# Patient Record
Sex: FEMALE | Race: WHITE | URBAN - METROPOLITAN AREA
[De-identification: names, ages, dates, MRNs, and addresses within clinical notes are randomized per-mention and may not be internally consistent; named-entity substitution may affect disease eponyms.]

---

## 2017-02-16 NOTE — PATIENT DISCUSSION
REVIEWED FINDINGS AND POTENTIAL FOR MISDIAGNOSIS AS DISC EDEMA - AND IF THAT HAPPENS TO MAKE SURE THAT AN OPHTHALMOLOGIST CONFIRMS

## 2017-05-16 NOTE — PATIENT DISCUSSION
REVIEWED FINDINGS AND POTENTIAL FOR MISDIAGNOSIS AS DISC EDEMA - AND IF THAT HAPPENS TO MAKE SURE THAT AN OPHTHALMOLOGIST CONFIRMS.

## 2020-09-10 NOTE — PATIENT DISCUSSION
PT JUST HAD MRI IN THE LAST MONTH - RESULTS - SMALL VESSEL DISEASE AND CAN'T R/O DEMYLINATING DIEASE - NO HX OF MS (GRAND DAUGHTER - NURSE PRACTITIONER - HAD RESULTS ON HER PHONE).

## 2020-09-10 NOTE — PATIENT DISCUSSION
REVIEWED FINDINGS AND POTENTIAL FOR MISDIAGNOSIS AS DISC EDEMA - AND IF THAT HAPPENS TO MAKE SURE THAT AN OPHTHALMOLOGIST CONFIRMS. No No

## 2020-09-10 NOTE — PATIENT DISCUSSION
Patient DENIES any Headache, jaw pain, neck pain, shoulder pain, loss of weight, fever, or loss of appetite to suggest TEMPORAL ARTERITIS.

## 2020-11-05 NOTE — PATIENT DISCUSSION
11 5 20 DIF STUDY - SIG FIXATION LOSS - NO SIG CHANGE OD - SUP VF LOSS OS NONSPECIFIC - ? 2ND OPTIC DISC DRUSEN BUT TYPICALL VF LOSS IS LESS.  RECOM JOSEPHINE WITH NEUROLOGIST TO EVALUATE FOR MS AND SHAMA VALDEZ FOR HER THOUGHTS ON VA LOSS - DISCUSSED OPTION OF EVAL AT .

## 2020-11-16 NOTE — PATIENT DISCUSSION
11/16/20: LIKELY SECONDARY TO ODD. MONITOR. LOW THRESHOLD OF BRAIN IMAGING IF SX WORSEN/CHANGE. AT THIS POINT TVL REPRESENT ODD ETIOLOGY.

## 2022-02-28 ENCOUNTER — IMPORTED ENCOUNTER (OUTPATIENT)
Dept: URBAN - METROPOLITAN AREA CLINIC 31 | Facility: CLINIC | Age: 77
End: 2022-02-28

## 2022-02-28 PROBLEM — H43.813: Noted: 2022-02-28

## 2022-02-28 PROBLEM — H25.811: Noted: 2022-02-28

## 2022-02-28 PROBLEM — H25.813: Noted: 2022-02-28

## 2022-02-28 PROCEDURE — 92004 COMPRE OPH EXAM NEW PT 1/>: CPT

## 2022-02-28 PROCEDURE — 92015 DETERMINE REFRACTIVE STATE: CPT

## 2022-02-28 NOTE — PATIENT DISCUSSION
1.  Discussed the risks benefits alternatives and limitations of cataract surgery including infection bleeding loss of vision retinal tears detachment. The patient stated a full understanding and a desire to proceed with the procedure in both eyes. Refractive options were reviewed. Patient has elected to be optimized for distance vision in both eyes. The patient will still need glasses for reading and to possibly fine tune distance vision. Schedule KPE/IOL OD/OS 2. PVD OU:  Patient was cautioned to call our office immediately if they experience a substantial change in their symptoms such as an increase in floaters persistent flashes loss of visual field (may appear as a shadow or a curtain) or decrease in visual acuity as these may indicate a retinal tear or detachment. If this is a new problem patient will need to return for re-examination  as determined by the [address]. Return for an appointment for [address] [place] [address] with Dr. Rhonda Sheriff.

## 2022-04-02 ASSESSMENT — VISUAL ACUITY
OD_CC: J1+
OS_GLARE: 20/50MED
OD_SC: J7
OD_GLARE: 20/50MED
OD_CC: 20/50
OS_SC: J7
OS_CC: J1+
OS_CC: 20/50-2
OD_SC: 20/30+2
OS_SC: 20/30+1

## 2022-04-02 ASSESSMENT — TONOMETRY
OS_IOP_MMHG: 16
OD_IOP_MMHG: 14

## 2022-04-12 ENCOUNTER — PRE-OP/H&P (OUTPATIENT)
Dept: URBAN - METROPOLITAN AREA CLINIC 29 | Facility: CLINIC | Age: 77
End: 2022-04-12

## 2022-04-12 DIAGNOSIS — H25.813: ICD-10-CM

## 2022-04-12 PROCEDURE — 92025NC COMP. CORNEAL TOPO, UNI OR BILAT,

## 2022-04-12 PROCEDURE — 92136 OPHTHALMIC BIOMETRY: CPT

## 2022-05-02 ENCOUNTER — SURGERY/PROCEDURE (OUTPATIENT)
Dept: URBAN - METROPOLITAN AREA SURGERY 17 | Facility: SURGERY | Age: 77
End: 2022-05-02

## 2022-05-02 DIAGNOSIS — H25.811: ICD-10-CM

## 2022-05-02 PROBLEM — Z96.1: Noted: 2022-05-02

## 2022-05-02 PROCEDURE — 66940 EXTRACTION OF LENS: CPT

## 2022-05-02 NOTE — PROCEDURE NOTE: SURGICAL
<p>PATIENT HAD POSITIVE VITREOUS PRESSURE AFTER PHACO W/ IRIS PROLAPSE/CAPSULAR DISTENTION. PATIENT WAS GIVEN MANNITOL, CASE WAS STOPPED BECAUSE IOP WAS TO HIGH.  WILL GO BACK IN ON A DIFFERENT DAY TO REMOVE RESIDUAL CORTEX AND PLACE IOL.</p><p>W/ FEP @ </p>

## 2022-05-03 ENCOUNTER — SURGERY/PROCEDURE (OUTPATIENT)
Dept: URBAN - METROPOLITAN AREA SURGERY 17 | Facility: SURGERY | Age: 77
End: 2022-05-03

## 2022-05-03 ENCOUNTER — POST-OP (OUTPATIENT)
Dept: URBAN - METROPOLITAN AREA CLINIC 29 | Facility: CLINIC | Age: 77
End: 2022-05-03

## 2022-05-03 DIAGNOSIS — H27.01: ICD-10-CM

## 2022-05-03 PROBLEM — Z96.1: Noted: 2022-05-02

## 2022-05-03 PROBLEM — Z96.1: Noted: 2022-05-03

## 2022-05-03 PROCEDURE — 66999PO NON CO-MANAGED OTHER SURGERY PO

## 2022-05-03 PROCEDURE — 66985 INSERT LENS PROSTHESIS: CPT

## 2022-05-03 ASSESSMENT — TONOMETRY
OD_IOP_MMHG: 41
OD_IOP_MMHG: 35

## 2022-05-03 ASSESSMENT — VISUAL ACUITY: OS_SC: 20/40

## 2022-05-03 NOTE — PATIENT DISCUSSION
Risks and benefits of removal of residual lens material and IOL implantation discussed.   Including infection bleeding, loss of vision retinal tears detachment, vitreous loss.  IOP elevated from residual cortex.  - Treat in office, Diamox 500 mg, Combigan and Vyzulta instilled.  Would pretreat with 12.5 grams IV mannitol in preop area, topical, IVS.

## 2022-05-04 ENCOUNTER — POST-OP (OUTPATIENT)
Dept: URBAN - METROPOLITAN AREA CLINIC 29 | Facility: CLINIC | Age: 77
End: 2022-05-04

## 2022-05-04 DIAGNOSIS — Z96.1: ICD-10-CM

## 2022-05-04 RX ORDER — BRIMONIDINE TARTRATE, TIMOLOL MALEATE 2; 5 MG/ML; MG/ML
1 SOLUTION/ DROPS OPHTHALMIC TWICE A DAY
End: 2022-05-04

## 2022-05-04 RX ORDER — LATANOPROSTENE BUNOD 0.24 MG/ML
1 SOLUTION/ DROPS OPHTHALMIC EVERY EVENING
End: 2022-05-04

## 2022-05-04 RX ORDER — PREDNISOLONE ACETATE 10 MG/ML: 1 SUSPENSION/ DROPS OPHTHALMIC

## 2022-05-04 ASSESSMENT — TONOMETRY
OD_IOP_MMHG: 14
OS_IOP_MMHG: 11

## 2022-05-04 ASSESSMENT — VISUAL ACUITY
OD_PH: 20/60
OD_SC: CF 2FT
OS_SC: 20/40
OS_PH: 20/30

## 2022-05-04 NOTE — PATIENT DISCUSSION
Post op instructions reviewed with patients including the use of drops. Increase PRED q2WA, other drops the same.

## 2022-05-06 ENCOUNTER — POST-OP (OUTPATIENT)
Dept: URBAN - METROPOLITAN AREA CLINIC 29 | Facility: CLINIC | Age: 77
End: 2022-05-06

## 2022-05-06 DIAGNOSIS — Z96.1: ICD-10-CM

## 2022-05-06 ASSESSMENT — TONOMETRY
OS_IOP_MMHG: 12
OD_IOP_MMHG: 18

## 2022-05-06 ASSESSMENT — VISUAL ACUITY
OS_SC: 20/40
OD_SC: 20/40+2

## 2022-05-06 NOTE — PATIENT DISCUSSION
Post op instructions reviewed with patients including the use of drops. Continue PRED q2WA for 2 more days then 4x/d, other drops the same.

## 2022-05-13 ENCOUNTER — POST-OP (OUTPATIENT)
Dept: URBAN - METROPOLITAN AREA CLINIC 29 | Facility: CLINIC | Age: 77
End: 2022-05-13

## 2022-05-13 DIAGNOSIS — Z96.1: ICD-10-CM

## 2022-05-13 DIAGNOSIS — H25.812: ICD-10-CM

## 2022-05-13 ASSESSMENT — VISUAL ACUITY
OD_SC: 20/30
OS_SC: 20/70

## 2022-05-13 ASSESSMENT — TONOMETRY
OD_IOP_MMHG: 15
OS_IOP_MMHG: 16

## 2022-05-13 NOTE — PATIENT DISCUSSION
Post op instructions reviewed with patients including the use of drops. Continue PRED 3x/d taper a drop a week to off, prolensa qd, dc polytrim.

## 2022-05-17 ENCOUNTER — PREPPED CHART (OUTPATIENT)
Dept: URBAN - METROPOLITAN AREA CLINIC 29 | Facility: CLINIC | Age: 77
End: 2022-05-17

## 2022-05-18 ENCOUNTER — POST-OP (OUTPATIENT)
Dept: URBAN - METROPOLITAN AREA CLINIC 29 | Facility: CLINIC | Age: 77
End: 2022-05-18

## 2022-05-18 DIAGNOSIS — Z96.1: ICD-10-CM

## 2022-05-18 DIAGNOSIS — H25.812: ICD-10-CM

## 2022-05-18 ASSESSMENT — VISUAL ACUITY
OS_SC: 20/30-2
OD_SC: 20/25
OD_SC: J3 @14"

## 2022-05-18 ASSESSMENT — TONOMETRY: OD_IOP_MMHG: 21

## 2022-05-18 NOTE — PATIENT DISCUSSION
The patient feels that the cataract is significantly impacting daily activities and has elected cataract surgery. The risks, benefits, and alternatives to surgery were discussed. The patient elects to proceed with surgery. OK to schedule OS.  Pre treat with IV Mannitol 12.5 grams in preop area.

## 2022-05-31 ENCOUNTER — PRE-OP/H&P (OUTPATIENT)
Dept: URBAN - METROPOLITAN AREA CLINIC 29 | Facility: CLINIC | Age: 77
End: 2022-05-31

## 2022-05-31 DIAGNOSIS — H25.812: ICD-10-CM

## 2022-05-31 DIAGNOSIS — Z96.1: ICD-10-CM

## 2022-05-31 PROCEDURE — 99499 UNLISTED E&M SERVICE: CPT

## 2022-06-06 ENCOUNTER — SURGERY/PROCEDURE (OUTPATIENT)
Dept: URBAN - METROPOLITAN AREA SURGERY 17 | Facility: SURGERY | Age: 77
End: 2022-06-06

## 2022-06-06 DIAGNOSIS — H25.812: ICD-10-CM

## 2022-06-06 PROCEDURE — 66984 XCAPSL CTRC RMVL W/O ECP: CPT

## 2022-06-07 ENCOUNTER — POST-OP (OUTPATIENT)
Dept: URBAN - METROPOLITAN AREA CLINIC 29 | Facility: CLINIC | Age: 77
End: 2022-06-07

## 2022-06-07 DIAGNOSIS — Z96.1: ICD-10-CM

## 2022-06-07 ASSESSMENT — VISUAL ACUITY
OD_SC: 20/25-2
OS_SC: 20/40-2
OS_SC: 20/40
OS: 20/70
OD_SC: 20/25-2
OD: 20/40

## 2022-06-07 ASSESSMENT — TONOMETRY
OS_IOP_MMHG: 17
OD_IOP_MMHG: 11

## 2022-06-14 ENCOUNTER — POST-OP (OUTPATIENT)
Dept: URBAN - METROPOLITAN AREA CLINIC 29 | Facility: CLINIC | Age: 77
End: 2022-06-14

## 2022-06-14 DIAGNOSIS — Z96.1: ICD-10-CM

## 2022-06-14 ASSESSMENT — VISUAL ACUITY
OD_SC: 20/25
OS_SC: 20/30

## 2022-06-14 ASSESSMENT — TONOMETRY
OS_IOP_MMHG: 14
OD_IOP_MMHG: 14

## 2022-07-09 ENCOUNTER — TELEPHONE ENCOUNTER (OUTPATIENT)
Dept: URBAN - METROPOLITAN AREA CLINIC 121 | Facility: CLINIC | Age: 77
End: 2022-07-09

## 2022-07-09 RX ORDER — METFORMIN HCL 500 MG/1
TABLET ORAL
Refills: 0 | OUTPATIENT
Start: 2009-05-06 | End: 2014-08-27

## 2022-07-09 RX ORDER — CITALOPRAM 20 MG/1
TABLET ORAL
Refills: 0 | OUTPATIENT
Start: 2009-05-06 | End: 2014-08-27

## 2022-07-09 RX ORDER — OMEPRAZOLE 20 MG/1
TABLET, DELAYED RELEASE ORAL
Refills: 0 | OUTPATIENT
Start: 2009-05-06 | End: 2014-08-27

## 2022-07-09 RX ORDER — FENOFIBRATE 160 MG/1
TABLET ORAL
Refills: 0 | OUTPATIENT
Start: 2009-05-06 | End: 2014-08-27

## 2022-07-09 RX ORDER — LOSARTAN POTASSIUM AND HYDROCHLOROTHIAZIDE 100; 25 MG/1; MG/1
TABLET, FILM COATED ORAL
Refills: 0 | OUTPATIENT
Start: 2009-05-06 | End: 2014-08-27

## 2022-07-10 ENCOUNTER — TELEPHONE ENCOUNTER (OUTPATIENT)
Dept: URBAN - METROPOLITAN AREA CLINIC 121 | Facility: CLINIC | Age: 77
End: 2022-07-10

## 2022-07-10 RX ORDER — FENOFIBRATE 160 MG/1
TABLET ORAL
Refills: 0 | Status: ACTIVE | COMMUNITY
Start: 2014-08-27

## 2022-07-10 RX ORDER — METFORMIN HCL 500 MG/1
TABLET ORAL
Refills: 0 | Status: ACTIVE | COMMUNITY
Start: 2014-08-27

## 2022-07-10 RX ORDER — ALLOPURINOL 300 MG/1
TABLET ORAL
Refills: 0 | Status: ACTIVE | COMMUNITY
Start: 2014-08-27

## 2022-07-10 RX ORDER — LEVOTHYROXINE SODIUM 25 UG/1
TABLET ORAL
Refills: 0 | Status: ACTIVE | COMMUNITY
Start: 2014-08-27

## 2022-07-10 RX ORDER — PRAVASTATIN SODIUM 20 MG/1
TABLET ORAL
Refills: 0 | Status: ACTIVE | COMMUNITY
Start: 2014-08-27

## 2022-07-10 RX ORDER — OMEPRAZOLE 20 MG/1
TABLET, DELAYED RELEASE ORAL
Refills: 0 | Status: ACTIVE | COMMUNITY
Start: 2014-08-27

## 2022-07-10 RX ORDER — LOSARTAN POTASSIUM AND HYDROCHLOROTHIAZIDE 50; 12.5 MG/1; MG/1
TABLET, FILM COATED ORAL
Refills: 0 | Status: ACTIVE | COMMUNITY
Start: 2014-08-27

## 2022-07-10 RX ORDER — CITALOPRAM HYDROBROMIDE 20 MG
TABLET ORAL
Refills: 0 | Status: ACTIVE | COMMUNITY
Start: 2014-08-27

## 2022-08-03 ENCOUNTER — POST-OP (OUTPATIENT)
Dept: URBAN - METROPOLITAN AREA CLINIC 29 | Facility: CLINIC | Age: 77
End: 2022-08-03

## 2022-08-03 DIAGNOSIS — Z96.1: ICD-10-CM

## 2022-08-03 ASSESSMENT — VISUAL ACUITY
OS_SC: 20/25-3
OD_SC: 20/20

## 2022-08-03 ASSESSMENT — TONOMETRY
OD_IOP_MMHG: 11
OS_IOP_MMHG: 14

## 2022-12-07 NOTE — PATIENT DISCUSSION
Monitor. Thalidomide Pregnancy And Lactation Text: This medication is Pregnancy Category X and is absolutely contraindicated during pregnancy. It is unknown if it is excreted in breast milk.

## 2023-02-21 NOTE — PATIENT DISCUSSION
-Patient is overdue for follow up with cardiology  -Given information to call and schedule  -ECHO 03/2022 showed EF 30-35%  -Heart cath 03/2022 with stent to PDA  -Moderate AR and mild/mod MR   No retinal tears or retinal detachment seen on clinical exam today. Reviewed the signs and symptoms of retinal tear/retinal detachment and the importance of calling for prompt evaluation should there be increasing floaters, new flashing lights, or decreasing peripheral vision in either eye at any time. Observation recommended.